# Patient Record
Sex: MALE | Race: WHITE | NOT HISPANIC OR LATINO | Employment: FULL TIME | URBAN - METROPOLITAN AREA
[De-identification: names, ages, dates, MRNs, and addresses within clinical notes are randomized per-mention and may not be internally consistent; named-entity substitution may affect disease eponyms.]

---

## 2023-12-09 ENCOUNTER — ANCILLARY PROCEDURE (OUTPATIENT)
Dept: GENERAL RADIOLOGY | Facility: CLINIC | Age: 53
End: 2023-12-09
Attending: FAMILY MEDICINE
Payer: OTHER MISCELLANEOUS

## 2023-12-09 ENCOUNTER — OFFICE VISIT (OUTPATIENT)
Dept: URGENT CARE | Facility: URGENT CARE | Age: 53
End: 2023-12-09
Payer: OTHER MISCELLANEOUS

## 2023-12-09 VITALS
DIASTOLIC BLOOD PRESSURE: 78 MMHG | RESPIRATION RATE: 16 BRPM | HEART RATE: 95 BPM | OXYGEN SATURATION: 97 % | SYSTOLIC BLOOD PRESSURE: 138 MMHG | TEMPERATURE: 97.2 F | WEIGHT: 249 LBS

## 2023-12-09 DIAGNOSIS — S89.91XA INJURY OF RIGHT KNEE, INITIAL ENCOUNTER: Primary | ICD-10-CM

## 2023-12-09 DIAGNOSIS — S89.91XA INJURY OF RIGHT KNEE, INITIAL ENCOUNTER: ICD-10-CM

## 2023-12-09 PROCEDURE — 99203 OFFICE O/P NEW LOW 30 MIN: CPT | Performed by: FAMILY MEDICINE

## 2023-12-09 PROCEDURE — 73562 X-RAY EXAM OF KNEE 3: CPT | Mod: TC | Performed by: RADIOLOGY

## 2023-12-09 RX ORDER — HYDROCHLOROTHIAZIDE 12.5 MG/1
12.5 TABLET ORAL DAILY
COMMUNITY

## 2023-12-09 RX ORDER — LOSARTAN POTASSIUM 100 MG/1
100 TABLET ORAL DAILY
COMMUNITY

## 2023-12-09 RX ORDER — ROSUVASTATIN CALCIUM 40 MG/1
40 TABLET, COATED ORAL DAILY
COMMUNITY

## 2023-12-09 ASSESSMENT — ENCOUNTER SYMPTOMS
ALLERGIC/IMMUNOLOGIC NEGATIVE: 1
CONSTITUTIONAL NEGATIVE: 1
EYES NEGATIVE: 1
CARDIOVASCULAR NEGATIVE: 1
HEMATOLOGIC/LYMPHATIC NEGATIVE: 1
NEUROLOGICAL NEGATIVE: 1
GASTROINTESTINAL NEGATIVE: 1
ARTHRALGIAS: 1
ENDOCRINE NEGATIVE: 1
RESPIRATORY NEGATIVE: 1
JOINT SWELLING: 1
PSYCHIATRIC NEGATIVE: 1

## 2023-12-09 ASSESSMENT — PAIN SCALES - GENERAL: PAINLEVEL: EXTREME PAIN (8)

## 2023-12-09 NOTE — LETTER
December 9, 2023      Kong Tristan  [address] [facility] [address]        To Whom It May Concern:    Kong Tristan  was seen on 12/09/2023.      Please excuse him until 12/20/2023 due to injury.        Sincerely,        Rosalee Montemayor MD

## 2023-12-09 NOTE — PROGRESS NOTES
SUBJECTIVE:   Kong Tristan is a 53 year old male presenting with a chief complaint of   Chief Complaint   Patient presents with    Work Comp     Right knee, missed a step and twisted leg, felt a pop, DOI 12/08/23       He is an established patient of [facility].    MS Injury/Pain    Onset of symptoms was 1 day(s) ago.  Location: right knee  Context:       The injury happened while at work      Mechanism: fall       Patient experienced immediate pain  Course of symptoms is worsening.    Severity moderate  Current and Associated symptoms: Pain, Swelling, Tenderness, and Decreased range of motion  Denies  Stiffness  Aggravating Factors: walking, weight-bearing, movement, exertion, repetitive motion, and twisting  Therapies to improve symptoms include: heat, ice, ibuprofen, and Tylenol  This is the first time this type of problem has occurred for this patient.   Patient is a 53-year-old presenting with right knee pain.  He had an injury yesterday when he stepped accidentally from his van onto the curb and missed the step. He had hyperextended his right knee and heard a pop.   Since then he has been having a hard time bearing weight.   There was immediate pain and swelling.  He has been icing it and taking over-the-counter medication.  His past medical history significant for hypertension, hyperlipidemia, obesity.  He denies any numbness or tingling down his leg.  This is the first time you have injury to the knee.    Review of Systems   Constitutional: Negative.    HENT: Negative.     Eyes: Negative.    Respiratory: Negative.     Cardiovascular: Negative.    Gastrointestinal: Negative.    Endocrine: Negative.    Genitourinary: Negative.    Musculoskeletal:  Positive for arthralgias and joint swelling.   Allergic/Immunologic: Negative.    Neurological: Negative.    Hematological: Negative.    Psychiatric/Behavioral: Negative.         No past medical history on file.  No family history on file.  Current Outpatient  Medications   Medication Sig Dispense Refill    hydrochlorothiazide (HYDRODIURIL) 12.5 MG tablet Take 12.5 mg by mouth daily      losartan (COZAAR) 100 MG tablet Take 100 mg by mouth daily      metoprolol (TOPROL-XL) 50 MG 24 hr tablet Take 50 mg by mouth daily.      rosuvastatin (CRESTOR) 40 MG tablet Take 40 mg by mouth daily       Social History     Tobacco Use    Smoking status: Every Day     Types: Cigarettes    Smokeless tobacco: Never   Substance Use Topics    Alcohol use: Not on file       OBJECTIVE  /78   Pulse 95   Temp 97.2  F (36.2  C) (Tympanic)   Resp 16   Wt 112.9 kg (249 lb)   SpO2 97%     Physical Exam  Constitutional:       Appearance: Normal appearance.   HENT:      Head: Normocephalic and atraumatic.      Mouth/Throat:      Mouth: Mucous membranes are moist.   Cardiovascular:      Rate and Rhythm: Normal rate and regular rhythm.      Pulses: Normal pulses.      Heart sounds: Normal heart sounds.   Pulmonary:      Effort: Pulmonary effort is normal.      Breath sounds: Normal breath sounds.   Abdominal:      General: Abdomen is flat. Bowel sounds are normal.   Musculoskeletal:         General: Swelling and tenderness present. Normal range of motion.      Cervical back: Normal range of motion and neck supple.   Skin:     General: Skin is warm and dry.   Neurological:      Mental Status: He is alert and oriented to person, place, and time.         Labs:  No results found for this or any previous visit (from the past 24 hour(s)).    X-Ray was done, my findings are: normal x-rays     ASSESSMENT:      ICD-10-CM    1. Injury of right knee, initial encounter  S89.91XA XR Knee Right 3 Views     Miscellaneous Order for DME - ONLY FOR DME     Knee Supplies Order Patellar Strap; Right       Likely a sprain. Knee brace provided to the patient asked to stay of the knee for a few days. Continue with antiinflammatories and icing     Medical Decision Making:    Differential Diagnosis:  MS Injury Pain:  sprain    Serious Comorbid Conditions:  Adult:  None    PLAN:    MS Injury/Pain  ice, heat, elevate, rest, stretching, and Ibuprofen    Followup:    If not improving or if condition worsens, follow up with your Primary Care Provider    There are no Patient Instructions on file for this visit.